# Patient Record
Sex: FEMALE | Race: WHITE | NOT HISPANIC OR LATINO | URBAN - METROPOLITAN AREA
[De-identification: names, ages, dates, MRNs, and addresses within clinical notes are randomized per-mention and may not be internally consistent; named-entity substitution may affect disease eponyms.]

---

## 2019-07-19 NOTE — TELEPHONE ENCOUNTER
RECORDS RECEIVED FROM: Left hand finger sprain per pt. Self referred. No outside records.   DATE RECEIVED: 07/23/19   NOTES STATUS DETAILS   OFFICE NOTE from referring provider n/a Self-referred   OFFICE NOTE from other specialist n/a    DISCHARGE SUMMARY from hospital n/a    DISCHARGE REPORT from the ER n/a    OPERATIVE REPORT n/a    MEDICATION LIST n/a    IMPLANT RECORD/STICKER n/a    LABS     CBC/DIFF n/a    CULTURES n/a    INJECTIONS DONE IN RADIOLOGY n/a    MRI n/a    CT SCAN n/a    XRAYS (IMAGES & REPORTS) n/a    TUMOR     PATHOLOGY  Slides & report n/a

## 2019-07-23 ENCOUNTER — OFFICE VISIT (OUTPATIENT)
Dept: ORTHOPEDICS | Facility: CLINIC | Age: 69
End: 2019-07-23
Payer: COMMERCIAL

## 2019-07-23 ENCOUNTER — PRE VISIT (OUTPATIENT)
Dept: ORTHOPEDICS | Facility: CLINIC | Age: 69
End: 2019-07-23

## 2019-07-23 ENCOUNTER — ANCILLARY PROCEDURE (OUTPATIENT)
Dept: GENERAL RADIOLOGY | Facility: CLINIC | Age: 69
End: 2019-07-23
Attending: FAMILY MEDICINE
Payer: COMMERCIAL

## 2019-07-23 VITALS — DIASTOLIC BLOOD PRESSURE: 84 MMHG | HEART RATE: 16 BPM | SYSTOLIC BLOOD PRESSURE: 139 MMHG | RESPIRATION RATE: 16 BRPM

## 2019-07-23 DIAGNOSIS — M79.645 FINGER PAIN, LEFT: ICD-10-CM

## 2019-07-23 DIAGNOSIS — S62.647A CLOSED NONDISPLACED FRACTURE OF PROXIMAL PHALANX OF LEFT LITTLE FINGER, INITIAL ENCOUNTER: ICD-10-CM

## 2019-07-23 DIAGNOSIS — M79.645 FINGER PAIN, LEFT: Primary | ICD-10-CM

## 2019-07-23 RX ORDER — CHOLECALCIFEROL (VITAMIN D3) 50 MCG
2000 TABLET ORAL
COMMUNITY
Start: 2015-03-23

## 2019-07-23 NOTE — PROGRESS NOTES
"Sports Medicine Clinic Visit    PCP: Ana Matute Peter is a 69 year old female who is seen  as self referral presenting with left little finger pain. She states that she was playing basketball and \"jammed her finger.\"    Injury: ball to finger    Location of Pain: left finger  Duration of Pain: 6 week(s)  Rating of Pain: 5/10  Pain is better with: Ice  Pain is worse with: making a fist   Additional Features: She is RHD   Treatment so far consists of: Ice  Prior History of related problems: None     /84   Pulse (!) 16   Resp 16          PMH:  No past medical history on file.    Active problem list:  There is no problem list on file for this patient.      FH:  No family history on file.    SH:  Social History     Socioeconomic History     Marital status: Single     Spouse name: Not on file     Number of children: Not on file     Years of education: Not on file     Highest education level: Not on file   Occupational History     Not on file   Social Needs     Financial resource strain: Not on file     Food insecurity:     Worry: Not on file     Inability: Not on file     Transportation needs:     Medical: Not on file     Non-medical: Not on file   Tobacco Use     Smoking status: Never Smoker     Smokeless tobacco: Never Used   Substance and Sexual Activity     Alcohol use: Not on file     Drug use: Not on file     Sexual activity: Not on file   Lifestyle     Physical activity:     Days per week: Not on file     Minutes per session: Not on file     Stress: Not on file   Relationships     Social connections:     Talks on phone: Not on file     Gets together: Not on file     Attends Jehovah's witness service: Not on file     Active member of club or organization: Not on file     Attends meetings of clubs or organizations: Not on file     Relationship status: Not on file     Intimate partner violence:     Fear of current or ex partner: Not on file     Emotionally abused: Not on file     Physically abused: " Not on file     Forced sexual activity: Not on file   Other Topics Concern     Not on file   Social History Narrative     Not on file       MEDS:  See EMR, reviewed  ALL:  See EMR, reviewed    REVIEW OF SYSTEMS:  CONSTITUTIONAL:NEGATIVE for fever, chills, change in weight  INTEGUMENTARY/SKIN: NEGATIVE for worrisome rashes, moles or lesions  EYES: NEGATIVE for vision changes or irritation  ENT/MOUTH: NEGATIVE for ear, mouth and throat problems  RESP:NEGATIVE for significant cough or SOB  BREAST: NEGATIVE for masses, tenderness or discharge  CV: NEGATIVE for chest pain, palpitations or peripheral edema  GI: NEGATIVE for nausea, abdominal pain, heartburn, or change in bowel habits  :NEGATIVE for frequency, dysuria, or hematuria  :NEGATIVE for frequency, dysuria, or hematuria  NEURO: NEGATIVE for weakness, dizziness or paresthesias  ENDOCRINE: NEGATIVE for temperature intolerance, skin/hair changes  HEME/ALLERGY/IMMUNE: NEGATIVE for bleeding problems  PSYCHIATRIC: NEGATIVE for changes in mood or affect      Subjective: She points to the PIP joint of the fifth finger as the digit that was initially swollen.  She has a mild amount of residual swelling.  She has a mild amount of swelling along the ulnar aspect of the distal proximal phalanx of the fifth digit.  There is no mallet finger and there is no swan-neck deformity.  She can make a full fist with normal tracking of the digit.  She has intact strength independently to flexion at the DIP, PIP, MCP and instead intact strength individually to extension at the DIP, PIP, MCP.  Stressing of the collateral ligaments of the PIP joint shows no laxity.  She is nontender over the volar plate.  Sensation is intact.  Capillary refill is normal.    Appropriate in conversation and affect.    An x-ray of the finger shows a tiny gavino avulsion at the ulnar aspect of the fifth PIP joint near the origin of the ulnar collateral ligament.  She has some mild scattered DJD changes  throughout the hand.    Assessment finger sprain with tiny avulsion fracture, healing    Plan: At this point do not think she needs to protect it with deshawn taping.  She knows that the swelling sometimes will take many months to disappear completely.  She has been using it for daily activities without restriction and I think she can continue to do so.  She is not involved in basketball on a regular basis and plans on avoiding it for now.  She will follow-up if not slowly improving over the next 6 weeks.

## 2019-07-23 NOTE — LETTER
"  7/23/2019      RE: Shereen Green  [address]  [address]  [place] [address]       Sports Medicine Clinic Visit    PCP: Ana Matute    Shereen Green is a 69 year old female who is seen  as self referral presenting with left little finger pain. She states that she was playing basketball and \"jammed her finger.\"    Injury: ball to finger    Location of Pain: left finger  Duration of Pain: 6 week(s)  Rating of Pain: 5/10  Pain is better with: Ice  Pain is worse with: making a fist   Additional Features: She is RHD   Treatment so far consists of: Ice  Prior History of related problems: None     /84   Pulse (!) 16   Resp 16          PMH:  No past medical history on file.    Active problem list:  There is no problem list on file for this patient.      FH:  No family history on file.    SH:  Social History     Socioeconomic History     Marital status: Single     Spouse name: Not on file     Number of children: Not on file     Years of education: Not on file     Highest education level: Not on file   Occupational History     Not on file   Social Needs     Financial resource strain: Not on file     Food insecurity:     Worry: Not on file     Inability: Not on file     Transportation needs:     Medical: Not on file     Non-medical: Not on file   Tobacco Use     Smoking status: Never Smoker     Smokeless tobacco: Never Used   Substance and Sexual Activity     Alcohol use: Not on file     Drug use: Not on file     Sexual activity: Not on file   Lifestyle     Physical activity:     Days per week: Not on file     Minutes per session: Not on file     Stress: Not on file   Relationships     Social connections:     Talks on phone: Not on file     Gets together: Not on file     Attends Mu-ism service: Not on file     Active member of club or organization: Not on file     Attends meetings of clubs or organizations: Not on file     Relationship status: Not on file     Intimate partner violence:     Fear " of current or ex partner: Not on file     Emotionally abused: Not on file     Physically abused: Not on file     Forced sexual activity: Not on file   Other Topics Concern     Not on file   Social History Narrative     Not on file       MEDS:  See EMR, reviewed  ALL:  See EMR, reviewed    REVIEW OF SYSTEMS:  CONSTITUTIONAL:NEGATIVE for fever, chills, change in weight  INTEGUMENTARY/SKIN: NEGATIVE for worrisome rashes, moles or lesions  EYES: NEGATIVE for vision changes or irritation  ENT/MOUTH: NEGATIVE for ear, mouth and throat problems  RESP:NEGATIVE for significant cough or SOB  BREAST: NEGATIVE for masses, tenderness or discharge  CV: NEGATIVE for chest pain, palpitations or peripheral edema  GI: NEGATIVE for nausea, abdominal pain, heartburn, or change in bowel habits  :NEGATIVE for frequency, dysuria, or hematuria  :NEGATIVE for frequency, dysuria, or hematuria  NEURO: NEGATIVE for weakness, dizziness or paresthesias  ENDOCRINE: NEGATIVE for temperature intolerance, skin/hair changes  HEME/ALLERGY/IMMUNE: NEGATIVE for bleeding problems  PSYCHIATRIC: NEGATIVE for changes in mood or affect      Subjective: She points to the PIP joint of the fifth finger as the digit that was initially swollen.  She has a mild amount of residual swelling.  She has a mild amount of swelling along the ulnar aspect of the distal proximal phalanx of the fifth digit.  There is no mallet finger and there is no swan-neck deformity.  She can make a full fist with normal tracking of the digit.  She has intact strength independently to flexion at the DIP, PIP, MCP and instead intact strength individually to extension at the DIP, PIP, MCP.  Stressing of the collateral ligaments of the PIP joint shows no laxity.  She is nontender over the volar plate.  Sensation is intact.  Capillary refill is normal.    Appropriate in conversation and affect.    An x-ray of the finger shows a tiny gavino avulsion at the ulnar aspect of the fifth PIP  joint near the origin of the ulnar collateral ligament.  She has some mild scattered DJD changes throughout the hand.    Assessment finger sprain with tiny avulsion fracture, healing    Plan: At this point do not think she needs to protect it with deshawn taping.  She knows that the swelling sometimes will take many months to disappear completely.  She has been using it for daily activities without restriction and I think she can continue to do so.  She is not involved in basketball on a regular basis and plans on avoiding it for now.  She will follow-up if not slowly improving over the next 6 weeks.        Alxeis Ritter MD

## 2021-03-30 ENCOUNTER — OFFICE VISIT (OUTPATIENT)
Dept: OPHTHALMOLOGY | Facility: CLINIC | Age: 71
End: 2021-03-30
Payer: COMMERCIAL

## 2021-03-30 DIAGNOSIS — H43.813 POSTERIOR VITREOUS DETACHMENT OF BOTH EYES: ICD-10-CM

## 2021-03-30 DIAGNOSIS — H25.813 COMBINED FORMS OF AGE-RELATED CATARACT OF BOTH EYES: Primary | ICD-10-CM

## 2021-03-30 DIAGNOSIS — H52.4 PRESBYOPIA: ICD-10-CM

## 2021-03-30 DIAGNOSIS — Z98.890 HISTORY OF REPAIR OF RETINAL TEAR BY LASER PHOTOCOAGULATION: ICD-10-CM

## 2021-03-30 PROBLEM — Z13.820 OSTEOPOROSIS SCREENING: Status: ACTIVE | Noted: 2017-06-26

## 2021-03-30 PROCEDURE — 92004 COMPRE OPH EXAM NEW PT 1/>: CPT | Performed by: OPHTHALMOLOGY

## 2021-03-30 PROCEDURE — 92015 DETERMINE REFRACTIVE STATE: CPT | Performed by: OPHTHALMOLOGY

## 2021-03-30 RX ORDER — MULTIVITAMIN
1 TABLET ORAL
COMMUNITY
Start: 2020-12-23

## 2021-03-30 ASSESSMENT — REFRACTION_MANIFEST
OD_ADD: +2.75
OD_CYLINDER: SPHERE
OD_SPHERE: -1.00
OS_ADD: +2.75
OS_AXIS: 179
OS_CYLINDER: +1.00
OS_SPHERE: -2.00

## 2021-03-30 ASSESSMENT — REFRACTION_WEARINGRX
OS_SPHERE: -1.75
OS_AXIS: 180
OD_CYLINDER: +0.25
SPECS_TYPE: SVL
OD_SPHERE: -0.75
OD_AXIS: 170
OS_CYLINDER: +0.75

## 2021-03-30 ASSESSMENT — VISUAL ACUITY
OS_CC+: -1
OD_CC: 20/25
OD_CC+: -1
OS_CC: 20/25
METHOD: SNELLEN - LINEAR
CORRECTION_TYPE: GLASSES

## 2021-03-30 ASSESSMENT — TONOMETRY
IOP_METHOD: APPLANATION
OD_IOP_MMHG: 14
OS_IOP_MMHG: 14

## 2021-03-30 ASSESSMENT — CONF VISUAL FIELD
OD_NORMAL: 1
OS_NORMAL: 1

## 2021-03-30 NOTE — PATIENT INSTRUCTIONS
Will observe cataracts for now.  Glasses Rx given - optional   Could try OTC half readers - +1.25 or +1.50  Use artificial tears up to 4 times daily both eyes as needed (Refresh Tears, Systane Ultra/Balance, or Theratears)   Call in November 2021 for an appointment in March 2022 for Complete Exam    Dr. Eldridge [phone]

## 2021-03-30 NOTE — PROGRESS NOTES
Current Eye Medications:  None     Subjective:  Here for cataract evaluation. Saw the last doctor in August, was told she has cataracts. Feels the eyes are more tired at the end of the day. Also feels there are some spots missing in the vision. In the past three to four weeks the eyes are so much more dry, wants to know what she might be safe to use. Had retinal tears repaired in the left eye at one time.    Previously saw Michael Chavez.  Freddie did laser left eye after fall down steps.  Glasses just for driving and TV; states reads well without correction.     Objective:  See Ophthalmology Exam.       Assessment:  Baseline eye exam in patient with mild-moderate cataracts and hx of laser for retinal tear left eye.      ICD-10-CM    1. Combined forms of age-related cataract, mild-mod, of both eyes  H25.813    2. History of repair of retinal tear by laser photocoagulation, os  Z98.890    3. Posterior vitreous detachment of both eyes  H43.813    4. Presbyopia  H52.4         Plan:  Will observe cataracts for now.  Glasses Rx given - optional   Could try OTC half readers - +1.25 or +1.50  Use artificial tears up to 4 times daily both eyes as needed (Refresh Tears, Systane Ultra/Balance, or Theratears)   Call in November 2021 for an appointment in March 2022 for Complete Exam    Dr. Eldridge [phone]

## 2021-03-30 NOTE — LETTER
3/30/2021         RE: Shereen Green  [address] [place] [address]        Dear Colleague,    Thank you for referring your patient, Shereen Green, to the [facility]. Please see a copy of my visit note below.     Current Eye Medications:  None     Subjective:  Here for cataract evaluation. Saw the last doctor in August, was told she has cataracts. Feels the eyes are more tired at the end of the day. Also feels there are some spots missing in the vision. In the past three to four weeks the eyes are so much more dry, wants to know what she might be safe to use. Had retinal tears repaired in the left eye at one time.    Previously saw Michael Chavez.  Freddie did laser left eye after fall down steps.  Glasses just for driving and TV; states reads well without correction.     Objective:  See Ophthalmology Exam.       Assessment:  Baseline eye exam in patient with mild-moderate cataracts and hx of laser for retinal tear left eye.      ICD-10-CM    1. Combined forms of age-related cataract, mild-mod, of both eyes  H25.813    2. History of repair of retinal tear by laser photocoagulation, os  Z98.890    3. Posterior vitreous detachment of both eyes  H43.813    4. Presbyopia  H52.4         Plan:  Will observe cataracts for now.  Glasses Rx given - optional   Could try OTC half readers - +1.25 or +1.50  Use artificial tears up to 4 times daily both eyes as needed (Refresh Tears, Systane Ultra/Balance, or Theratears)   Call in November 2021 for an appointment in March 2022 for Complete Exam    Dr. Eldridge [phone]        Again, thank you for allowing me to participate in the care of your patient.        Sincerely,        Demetrio Eldridge MD

## 2021-04-03 PROBLEM — Z98.890 HISTORY OF REPAIR OF RETINAL TEAR BY LASER PHOTOCOAGULATION: Status: ACTIVE | Noted: 2021-04-03

## 2021-04-03 PROBLEM — H25.813 COMBINED FORMS OF AGE-RELATED CATARACT OF BOTH EYES: Status: ACTIVE | Noted: 2021-04-03

## 2021-04-03 PROBLEM — H43.813 POSTERIOR VITREOUS DETACHMENT OF BOTH EYES: Status: ACTIVE | Noted: 2021-04-03

## 2021-04-03 ASSESSMENT — SLIT LAMP EXAM - LIDS
COMMENTS: NORMAL
COMMENTS: NORMAL

## 2021-04-03 ASSESSMENT — EXTERNAL EXAM - RIGHT EYE: OD_EXAM: MILD TO MOD BROW

## 2021-04-03 ASSESSMENT — CUP TO DISC RATIO
OD_RATIO: 0.1
OS_RATIO: 0.3

## 2021-11-11 NOTE — TELEPHONE ENCOUNTER
DIAGNOSIS: right knee pain,self,no imaging   APPOINTMENT DATE: 11.16.21   NOTES STATUS DETAILS   OFFICE NOTE from referring provider N/A    OFFICE NOTE from other specialist N/A    DISCHARGE SUMMARY from hospital N/A    DISCHARGE REPORT from the ER N/A    OPERATIVE REPORT N/A    EMG report N/A    MEDICATION LIST Internal    MRI N/A    DEXA (osteoporosis/bone health) N/A    CT SCAN N/A    XRAYS (IMAGES & REPORTS) N/A

## 2021-11-16 ENCOUNTER — PRE VISIT (OUTPATIENT)
Dept: ORTHOPEDICS | Facility: CLINIC | Age: 71
End: 2021-11-16

## 2021-11-19 ENCOUNTER — TELEPHONE (OUTPATIENT)
Dept: OPHTHALMOLOGY | Facility: CLINIC | Age: 71
End: 2021-11-19
Payer: COMMERCIAL

## 2021-11-19 NOTE — TELEPHONE ENCOUNTER
Reason for Call:  Other call back    Detailed comments: Patient is calling to schedule surgery orders are needed. Thank yo u    Phone Number Patient can be reached at: Home number on file [phone] (home)    Best Time: any    Can we leave a detailed message on this number? YES    Call taken on 11/19/2021 at 12:28 PM by Valerie Valles

## 2021-11-23 DIAGNOSIS — M25.569 KNEE PAIN: Primary | ICD-10-CM

## 2021-11-24 ENCOUNTER — OFFICE VISIT (OUTPATIENT)
Dept: ORTHOPEDICS | Facility: CLINIC | Age: 71
End: 2021-11-24
Payer: COMMERCIAL

## 2021-11-24 ENCOUNTER — ANCILLARY PROCEDURE (OUTPATIENT)
Dept: GENERAL RADIOLOGY | Facility: CLINIC | Age: 71
End: 2021-11-24
Attending: FAMILY MEDICINE
Payer: COMMERCIAL

## 2021-11-24 VITALS — BODY MASS INDEX: 23.7 KG/M2 | WEIGHT: 175 LBS | HEIGHT: 72 IN

## 2021-11-24 DIAGNOSIS — M25.561 RIGHT KNEE PAIN: ICD-10-CM

## 2021-11-24 DIAGNOSIS — M23.306 DEGENERATIVE TEAR OF MENISCUS, RIGHT: ICD-10-CM

## 2021-11-24 DIAGNOSIS — M71.21 BAKER CYST, RIGHT: ICD-10-CM

## 2021-11-24 DIAGNOSIS — M17.11 PRIMARY OSTEOARTHRITIS OF RIGHT KNEE: Primary | ICD-10-CM

## 2021-11-24 LAB — RADIOLOGIST FLAGS: NORMAL

## 2021-11-24 PROCEDURE — 73560 X-RAY EXAM OF KNEE 1 OR 2: CPT | Mod: LT | Performed by: RADIOLOGY

## 2021-11-24 PROCEDURE — 99213 OFFICE O/P EST LOW 20 MIN: CPT | Performed by: FAMILY MEDICINE

## 2021-11-24 ASSESSMENT — MIFFLIN-ST. JEOR: SCORE: 1406.82

## 2021-11-24 NOTE — LETTER
11/24/2021      RE: Shereen Green  [address] [place] [address]       Sports Medicine Clinic Visit    PCP: Ana Matute    Shereen Green is a 71 year old female who is seen  as self referral presenting with right knee pain.  3 weeks ago she developed discomfort particular to the posterior aspect of her right knee with visible swelling and discomfort in the upper calf.  It was difficult to bend and straighten the knee.  It was tender to touch in the area of the posterior knee and calf.  It was hard to bear weight on the knee.  She can think of no injury.  Since that time the knee has improved significantly and she can now bear weight, can bend and straighten the knee and the swelling and discomfort in the posterior knee has resolved.  She has a past history of a Baker's cyst involving the opposite knee.  X-rays of the right knee in 2013 showed mild DJD.    Injury: No specific injury    Location of Pain: right posterior knee pain  Duration of Pain: 3 week(s)  Rating of Pain: 10/10  Pain is better with: Ice, Ibuprofen and Elevation  Pain is worse with: general movement  Additional Features: swelling  Treatment so far consists of: Ice, Ibuprofen and elevation  Prior History of related problems: no.  Mild rt knee djd on xray 2013.        There were no vitals taken for this visit.        EXAM: XR KNEE 3 VIEWS RIGHT   LOCATION: [facility]   DATE/TIME: 11/11/2021 11:30 AM     INDICATION: Acute Pain Of Right Knee   COMPARISON: None.     IMPRESSION: Mild scattered degenerative changes right knee. Right knee otherwise negative. No fracture or dislocation. No joint effusion.          PMH:  Past Medical History:   Diagnosis Date     Arthritis      Nonsenile cataract      Retinal detachment    htn  Basal cell ca  Prolapsed mitral valve  Back and neck injuries  tonsillectomy 1950  Hysterectomy 2000  Wrist tendon release 2005    Active problem list:  Patient Active Problem List    Diagnosis     Cervical spine arthritis     Chronic headaches     Lumbago     Mixed hyperlipidemia     Osteoporosis screening     TR (tricuspid regurgitation)     History of repair of retinal tear by laser photocoagulation, os     Posterior vitreous detachment of both eyes     Combined forms of age-related cataract, mild-mod, of both eyes       FH:  No family history on file.    SH:  Social History     Socioeconomic History     Marital status: Single     Spouse name: Not on file     Number of children: Not on file     Years of education: Not on file     Highest education level: Not on file   Occupational History     Not on file   Tobacco Use     Smoking status: Never Smoker     Smokeless tobacco: Never Used   Substance and Sexual Activity     Alcohol use: Not on file     Drug use: Not on file     Sexual activity: Not on file   Other Topics Concern     Not on file   Social History Narrative     Not on file     Social Determinants of Health     Financial Resource Strain: Not on file   Food Insecurity: Not on file   Transportation Needs: Not on file   Physical Activity: Not on file   Stress: Not on file   Social Connections: Not on file   Intimate Partner Violence: Not on file   Housing Stability: Not on file       MEDS:  See EMR, reviewed  ALL:  See EMR, reviewed    REVIEW OF SYSTEMS:  CONSTITUTIONAL:NEGATIVE for fever, chills, change in weight  INTEGUMENTARY/SKIN: NEGATIVE for worrisome rashes, moles or lesions  EYES: NEGATIVE for vision changes or irritation  ENT/MOUTH: NEGATIVE for ear, mouth and throat problems  RESP:NEGATIVE for significant cough or SOB  BREAST: NEGATIVE for masses, tenderness or discharge  CV: NEGATIVE for chest pain, palpitations or peripheral edema  GI: NEGATIVE for nausea, abdominal pain, heartburn, or change in bowel habits  :NEGATIVE for frequency, dysuria, or hematuria  :NEGATIVE for frequency, dysuria, or hematuria  NEURO: NEGATIVE for weakness, dizziness or paresthesias  ENDOCRINE:  NEGATIVE for temperature intolerance, skin/hair changes  HEME/ALLERGY/IMMUNE: NEGATIVE for bleeding problems  PSYCHIATRIC: NEGATIVE for changes in mood or affect        MR Spine Lumbar wo Contrast * 2/16/2006    Anatomical Region Laterality Modality   Spine -- Magnetic Resonance   LUMBAR SPINE -- --       Impression    1.  Spondylotic and discogenic degenerative change at L4-5 resulting in   moderate narrowing of the lateral recesses bilaterally with mild   compression of the traversing L5 nerve roots bilaterally.           Ct scan ABD/pelvis 12/23/2020 showed lumbar spine djd changes, without spondylolisthesis noted.          Objective: Today in evaluation of the posterior knees visually I do not see obvious swelling or discoloration.  The right knee is nontender today in the upper calf or the popliteal space but there is a sensation of some mild residual swelling that could be associated with a Baker's cyst on the right popliteal space compared to the nonaffected left knee.  She allows a full flexion of the knee in full extension which she says was very difficult to do more than a week ago.  She is nontender over the medial joint line or the lateral joint line.  There is no effusion involving the knee itself.  Patellar apprehension signs are negative.  Nontender over the patellar tendon or pes anserine bursa.  Overlying skin is normal.  Appropriate in conversation affect.    She indicates she had nonweightbearing x-rays of her knee within the past 3 weeks.  Request was made to obtain these x-rays.  In the meantime we ordered weightbearing PA and AP views standing of the knee that suggests increased medial joint space narrowing compared to 2013 with her bilateral knees with severe DJD noted on the left and moderate DJD on the right.  No signs of osteonecrosis.  She denies left-sided knee discomfort    Assessment right-sided knee degenerative meniscal tear and Baker's cyst with moderate DJD    Plan: She declines the  need for a cortisone injection or additional pain medicines.  We talked about the natural history and conservative care of Baker's cyst and degenerative disease of the knee.  We discussed indications for cortisone injections for pain control or indications for knee replacement.  She had no further questions and will follow-up as needed.        Alexis Ritter MD

## 2021-11-24 NOTE — PROGRESS NOTES
Sports Medicine Clinic Visit    PCP: Ana Matute VERITO Green is a 71 year old female who is seen  as self referral presenting with right knee pain.  3 weeks ago she developed discomfort particular to the posterior aspect of her right knee with visible swelling and discomfort in the upper calf.  It was difficult to bend and straighten the knee.  It was tender to touch in the area of the posterior knee and calf.  It was hard to bear weight on the knee.  She can think of no injury.  Since that time the knee has improved significantly and she can now bear weight, can bend and straighten the knee and the swelling and discomfort in the posterior knee has resolved.  She has a past history of a Baker's cyst involving the opposite knee.  X-rays of the right knee in 2013 showed mild DJD.    Injury: No specific injury    Location of Pain: right posterior knee pain  Duration of Pain: 3 week(s)  Rating of Pain: 10/10  Pain is better with: Ice, Ibuprofen and Elevation  Pain is worse with: general movement  Additional Features: swelling  Treatment so far consists of: Ice, Ibuprofen and elevation  Prior History of related problems: no.  Mild rt knee djd on xray 2013.        There were no vitals taken for this visit.        EXAM: XR KNEE 3 VIEWS RIGHT   LOCATION: [facility]   DATE/TIME: 11/11/2021 11:30 AM     INDICATION: Acute Pain Of Right Knee   COMPARISON: None.     IMPRESSION: Mild scattered degenerative changes right knee. Right knee otherwise negative. No fracture or dislocation. No joint effusion.          PMH:  Past Medical History:   Diagnosis Date     Arthritis      Nonsenile cataract      Retinal detachment    htn  Basal cell ca  Prolapsed mitral valve  Back and neck injuries  tonsillectomy 1950  Hysterectomy 2000  Wrist tendon release 2005    Active problem list:  Patient Active Problem List   Diagnosis     Cervical spine arthritis     Chronic headaches     Lumbago     Mixed  hyperlipidemia     Osteoporosis screening     TR (tricuspid regurgitation)     History of repair of retinal tear by laser photocoagulation, os     Posterior vitreous detachment of both eyes     Combined forms of age-related cataract, mild-mod, of both eyes       FH:  No family history on file.    SH:  Social History     Socioeconomic History     Marital status: Single     Spouse name: Not on file     Number of children: Not on file     Years of education: Not on file     Highest education level: Not on file   Occupational History     Not on file   Tobacco Use     Smoking status: Never Smoker     Smokeless tobacco: Never Used   Substance and Sexual Activity     Alcohol use: Not on file     Drug use: Not on file     Sexual activity: Not on file   Other Topics Concern     Not on file   Social History Narrative     Not on file     Social Determinants of Health     Financial Resource Strain: Not on file   Food Insecurity: Not on file   Transportation Needs: Not on file   Physical Activity: Not on file   Stress: Not on file   Social Connections: Not on file   Intimate Partner Violence: Not on file   Housing Stability: Not on file       MEDS:  See EMR, reviewed  ALL:  See EMR, reviewed    REVIEW OF SYSTEMS:  CONSTITUTIONAL:NEGATIVE for fever, chills, change in weight  INTEGUMENTARY/SKIN: NEGATIVE for worrisome rashes, moles or lesions  EYES: NEGATIVE for vision changes or irritation  ENT/MOUTH: NEGATIVE for ear, mouth and throat problems  RESP:NEGATIVE for significant cough or SOB  BREAST: NEGATIVE for masses, tenderness or discharge  CV: NEGATIVE for chest pain, palpitations or peripheral edema  GI: NEGATIVE for nausea, abdominal pain, heartburn, or change in bowel habits  :NEGATIVE for frequency, dysuria, or hematuria  :NEGATIVE for frequency, dysuria, or hematuria  NEURO: NEGATIVE for weakness, dizziness or paresthesias  ENDOCRINE: NEGATIVE for temperature intolerance, skin/hair changes  HEME/ALLERGY/IMMUNE:  NEGATIVE for bleeding problems  PSYCHIATRIC: NEGATIVE for changes in mood or affect        MR Spine Lumbar wo Contrast * 2/16/2006    Anatomical Region Laterality Modality   Spine -- Magnetic Resonance   LUMBAR SPINE -- --       Impression    1.  Spondylotic and discogenic degenerative change at L4-5 resulting in   moderate narrowing of the lateral recesses bilaterally with mild   compression of the traversing L5 nerve roots bilaterally.           Ct scan ABD/pelvis 12/23/2020 showed lumbar spine djd changes, without spondylolisthesis noted.          Objective: Today in evaluation of the posterior knees visually I do not see obvious swelling or discoloration.  The right knee is nontender today in the upper calf or the popliteal space but there is a sensation of some mild residual swelling that could be associated with a Baker's cyst on the right popliteal space compared to the nonaffected left knee.  She allows a full flexion of the knee in full extension which she says was very difficult to do more than a week ago.  She is nontender over the medial joint line or the lateral joint line.  There is no effusion involving the knee itself.  Patellar apprehension signs are negative.  Nontender over the patellar tendon or pes anserine bursa.  Overlying skin is normal.  Appropriate in conversation affect.    She indicates she had nonweightbearing x-rays of her knee within the past 3 weeks.  Request was made to obtain these x-rays.  In the meantime we ordered weightbearing PA and AP views standing of the knee that suggests increased medial joint space narrowing compared to 2013 with her bilateral knees with severe DJD noted on the left and moderate DJD on the right.  No signs of osteonecrosis.  She denies left-sided knee discomfort    Assessment right-sided knee degenerative meniscal tear and Baker's cyst with moderate DJD    Plan: She declines the need for a cortisone injection or additional pain medicines.  We talked about  the natural history and conservative care of Baker's cyst and degenerative disease of the knee.  We discussed indications for cortisone injections for pain control or indications for knee replacement.  She had no further questions and will follow-up as needed.

## 2021-12-01 NOTE — PROGRESS NOTES
"  Patient presents for follow-up for her right knee.  She is having enough weightbearing pain that she would like a right-sided cortisone injection per last discussion.  She tolerated a left-sided procedure involving cortisone for knee without side effect.  She does tell me that she had a very uncomfortable cortisone injection in the heel that caused her to need assisted weightbearing for a week after the heel procedure.  She did not have an allergic reaction with an anaphylactic response.    ight knee pain/djd/baker's cyst fup.  Recent standing of the knee that suggests increased medial joint space narrowing compared to 2013 with her bilateral knees with severe DJD noted on the left and moderate DJD on the right.    4 weeks ago she developed discomfort particular to the posterior aspect of her right knee with visible swelling and discomfort in the upper calf.  It was difficult to bend and straighten the knee.  It was tender to touch in the area of the posterior knee and calf.  It was hard to bear weight on the knee.  She can think of no injury.  Since that time the knee has improved and she can now bear weight, can bend and straighten the knee and the swelling and discomfort in the posterior knee has resolved.  She has a past history of a Baker's cyst involving the opposite knee.  X-rays of the right knee in 2013 showed mild DJD.    Left knee xray 11/24/21 has incidental finding of possible enchondroma, with MRI suggested by radiology.  She has no left knee pain.  \"Impression:  1. Left distal femoral density which is decreased in size from prior,  however, with new increased adjacent radiolucency Recommend MRI to  follow up.  2. Medial tibiofemoral predominant osteoarthrosis bilaterally.\"      PMH:  Past Medical History:   Diagnosis Date     Arthritis      Nonsenile cataract      Retinal detachment      htn  Basal cell ca  Prolapsed mitral valve  Back and neck injuries  tonsillectomy 1950  Hysterectomy 2000  Wrist " tendon release 2005  Active problem list:  Patient Active Problem List   Diagnosis     Cervical spine arthritis     Chronic headaches     Lumbago     Mixed hyperlipidemia     Osteoporosis screening     TR (tricuspid regurgitation)     History of repair of retinal tear by laser photocoagulation, os     Posterior vitreous detachment of both eyes     Combined forms of age-related cataract, mild-mod, of both eyes       FH:  No family history on file.    SH:  Social History     Socioeconomic History     Marital status: Single     Spouse name: Not on file     Number of children: Not on file     Years of education: Not on file     Highest education level: Not on file   Occupational History     Not on file   Tobacco Use     Smoking status: Never Smoker     Smokeless tobacco: Never Used   Substance and Sexual Activity     Alcohol use: Not on file     Drug use: Not on file     Sexual activity: Not on file   Other Topics Concern     Not on file   Social History Narrative     Not on file     Social Determinants of Health     Financial Resource Strain: Not on file   Food Insecurity: Not on file   Transportation Needs: Not on file   Physical Activity: Not on file   Stress: Not on file   Social Connections: Not on file   Intimate Partner Violence: Not on file   Housing Stability: Not on file       MEDS:  See EMR, reviewed  ALL:  See EMR, reviewed    REVIEW OF SYSTEMS:  CONSTITUTIONAL:NEGATIVE for fever, chills, change in weight  INTEGUMENTARY/SKIN: NEGATIVE for worrisome rashes, moles or lesions  EYES: NEGATIVE for vision changes or irritation  ENT/MOUTH: NEGATIVE for ear, mouth and throat problems  RESP:NEGATIVE for significant cough or SOB  BREAST: NEGATIVE for masses, tenderness or discharge  CV: NEGATIVE for chest pain, palpitations or peripheral edema  GI: NEGATIVE for nausea, abdominal pain, heartburn, or change in bowel habits  :NEGATIVE for frequency, dysuria, or hematuria  :NEGATIVE for frequency, dysuria, or  hematuria  NEURO: NEGATIVE for weakness, dizziness or paresthesias  ENDOCRINE: NEGATIVE for temperature intolerance, skin/hair changes  HEME/ALLERGY/IMMUNE: NEGATIVE for bleeding problems  PSYCHIATRIC: NEGATIVE for changes in mood or affect    Large Joint Injection/Arthocentesis: R knee joint    Date/Time: 12/2/2021 10:34 AM  Performed by: Alexis Ritter MD  Authorized by: Alexis Ritter MD     Indications:  Pain and osteoarthritis  Needle Size:  25 G  Guidance: landmark guided    Approach:  Anterolateral  Location:  Knee      Medications:  40 mg triamcinolone 40 MG/ML; 4 mL lidocaine (PF) 1 %  Outcome:  Tolerated well, no immediate complications  Procedure discussed: discussed risks, benefits, and alternatives    Consent Given by:  Patient  Timeout: timeout called immediately prior to procedure    Prep: patient was prepped and draped in usual sterile fashion     Scribed by Valentín Johnson ATC, ATC for Dr. Ritter on 12/2/21 at 10:00AM, based on the provider's statements to me.                Objective I do not palpate an intra-articular effusion involving the right knee.  There is no redness.  There is no tenderness in the popliteal space or calf.  She is tender over the medial joint line but nontender over the lateral joint line.  I can flex and extend the right knee fully.  Overlying skin is normal.  Appropriate in conversation affect.    Assessment right-sided knee DJD.  Left-sided distal femur bone lesion on x-ray    Plan: We went over her left femur pictures.  The radiologist suggested an MRI.  An MRI is pending as well as a phone conversation after the MRI results return.  For the right knee, after informed consent about bleeding, infection, steroid flare after prepping with surgical scrub she was injected in the right knee from a lateral approach in a seated position with 1 cc of Kenalog 40 and 5 cc of 1% lidocaine.  The medicine went in easily she left the clinic ambulatory she will look for  improved with the injection and follow-up as needed.  She would like to see physical therapy for her right knee for some alignment exercises in the setting of arthritis.  Referral placed.

## 2021-12-02 ENCOUNTER — OFFICE VISIT (OUTPATIENT)
Dept: ORTHOPEDICS | Facility: CLINIC | Age: 71
End: 2021-12-02
Payer: COMMERCIAL

## 2021-12-02 VITALS — WEIGHT: 175 LBS | BODY MASS INDEX: 23.7 KG/M2 | HEIGHT: 72 IN

## 2021-12-02 DIAGNOSIS — M89.9 LYTIC BONE LESION OF LEFT FEMUR: Primary | ICD-10-CM

## 2021-12-02 DIAGNOSIS — M17.11 PRIMARY OSTEOARTHRITIS OF RIGHT KNEE: ICD-10-CM

## 2021-12-02 PROCEDURE — 20610 DRAIN/INJ JOINT/BURSA W/O US: CPT | Mod: RT | Performed by: FAMILY MEDICINE

## 2021-12-02 PROCEDURE — 99213 OFFICE O/P EST LOW 20 MIN: CPT | Mod: 25 | Performed by: FAMILY MEDICINE

## 2021-12-02 RX ADMIN — TRIAMCINOLONE ACETONIDE 40 MG: 40 INJECTION, SUSPENSION INTRA-ARTICULAR; INTRAMUSCULAR at 10:34

## 2021-12-02 RX ADMIN — LIDOCAINE HYDROCHLORIDE 4 ML: 10 INJECTION, SOLUTION EPIDURAL; INFILTRATION; INTRACAUDAL; PERINEURAL at 10:34

## 2021-12-02 ASSESSMENT — MIFFLIN-ST. JEOR: SCORE: 1406.82

## 2021-12-02 NOTE — LETTER
"  12/2/2021      RE: Shereen Green  [address] [facility] [address]         Patient presents for follow-up for her right knee.  She is having enough weightbearing pain that she would like a right-sided cortisone injection per last discussion.  She tolerated a left-sided procedure involving cortisone for knee without side effect.  She does tell me that she had a very uncomfortable cortisone injection in the heel that caused her to need assisted weightbearing for a week after the heel procedure.  She did not have an allergic reaction with an anaphylactic response.    ight knee pain/djd/baker's cyst fup.  Recent standing of the knee that suggests increased medial joint space narrowing compared to 2013 with her bilateral knees with severe DJD noted on the left and moderate DJD on the right.    4 weeks ago she developed discomfort particular to the posterior aspect of her right knee with visible swelling and discomfort in the upper calf.  It was difficult to bend and straighten the knee.  It was tender to touch in the area of the posterior knee and calf.  It was hard to bear weight on the knee.  She can think of no injury.  Since that time the knee has improved and she can now bear weight, can bend and straighten the knee and the swelling and discomfort in the posterior knee has resolved.  She has a past history of a Baker's cyst involving the opposite knee.  X-rays of the right knee in 2013 showed mild DJD.    Left knee xray 11/24/21 has incidental finding of possible enchondroma, with MRI suggested by radiology.  She has no left knee pain.  \"Impression:  1. Left distal femoral density which is decreased in size from prior,  however, with new increased adjacent radiolucency Recommend MRI to  follow up.  2. Medial tibiofemoral predominant osteoarthrosis bilaterally.\"      PMH:  Past Medical History:   Diagnosis Date     Arthritis      Nonsenile cataract      Retinal detachment      htn  Basal cell ca  Prolapsed " mitral valve  Back and neck injuries  tonsillectomy 1950  Hysterectomy 2000  Wrist tendon release 2005  Active problem list:  Patient Active Problem List   Diagnosis     Cervical spine arthritis     Chronic headaches     Lumbago     Mixed hyperlipidemia     Osteoporosis screening     TR (tricuspid regurgitation)     History of repair of retinal tear by laser photocoagulation, os     Posterior vitreous detachment of both eyes     Combined forms of age-related cataract, mild-mod, of both eyes       FH:  No family history on file.    SH:  Social History     Socioeconomic History     Marital status: Single     Spouse name: Not on file     Number of children: Not on file     Years of education: Not on file     Highest education level: Not on file   Occupational History     Not on file   Tobacco Use     Smoking status: Never Smoker     Smokeless tobacco: Never Used   Substance and Sexual Activity     Alcohol use: Not on file     Drug use: Not on file     Sexual activity: Not on file   Other Topics Concern     Not on file   Social History Narrative     Not on file     Social Determinants of Health     Financial Resource Strain: Not on file   Food Insecurity: Not on file   Transportation Needs: Not on file   Physical Activity: Not on file   Stress: Not on file   Social Connections: Not on file   Intimate Partner Violence: Not on file   Housing Stability: Not on file       MEDS:  See EMR, reviewed  ALL:  See EMR, reviewed    REVIEW OF SYSTEMS:  CONSTITUTIONAL:NEGATIVE for fever, chills, change in weight  INTEGUMENTARY/SKIN: NEGATIVE for worrisome rashes, moles or lesions  EYES: NEGATIVE for vision changes or irritation  ENT/MOUTH: NEGATIVE for ear, mouth and throat problems  RESP:NEGATIVE for significant cough or SOB  BREAST: NEGATIVE for masses, tenderness or discharge  CV: NEGATIVE for chest pain, palpitations or peripheral edema  GI: NEGATIVE for nausea, abdominal pain, heartburn, or change in bowel habits  :NEGATIVE  for frequency, dysuria, or hematuria  :NEGATIVE for frequency, dysuria, or hematuria  NEURO: NEGATIVE for weakness, dizziness or paresthesias  ENDOCRINE: NEGATIVE for temperature intolerance, skin/hair changes  HEME/ALLERGY/IMMUNE: NEGATIVE for bleeding problems  PSYCHIATRIC: NEGATIVE for changes in mood or affect    Large Joint Injection/Arthocentesis: R knee joint    Date/Time: 12/2/2021 10:34 AM  Performed by: Alexis Ritter MD  Authorized by: Alexis Ritter MD     Indications:  Pain and osteoarthritis  Needle Size:  25 G  Guidance: landmark guided    Approach:  Anterolateral  Location:  Knee      Medications:  40 mg triamcinolone 40 MG/ML; 4 mL lidocaine (PF) 1 %  Outcome:  Tolerated well, no immediate complications  Procedure discussed: discussed risks, benefits, and alternatives    Consent Given by:  Patient  Timeout: timeout called immediately prior to procedure    Prep: patient was prepped and draped in usual sterile fashion     Scribed by Valentín Johnson ATC, ATC for Dr. Ritter on 12/2/21 at 10:00AM, based on the provider's statements to me.                Objective I do not palpate an intra-articular effusion involving the right knee.  There is no redness.  There is no tenderness in the popliteal space or calf.  She is tender over the medial joint line but nontender over the lateral joint line.  I can flex and extend the right knee fully.  Overlying skin is normal.  Appropriate in conversation affect.    Assessment right-sided knee DJD.  Left-sided distal femur bone lesion on x-ray    Plan: We went over her left femur pictures.  The radiologist suggested an MRI.  An MRI is pending as well as a phone conversation after the MRI results return.  For the right knee, after informed consent about bleeding, infection, steroid flare after prepping with surgical scrub she was injected in the right knee from a lateral approach in a seated position with 1 cc of Kenalog 40 and 5 cc of 1% lidocaine.  The  medicine went in easily she left the clinic ambulatory she will look for improved with the injection and follow-up as needed.  She would like to see physical therapy for her right knee for some alignment exercises in the setting of arthritis.  Referral placed.        Alexis Ritter MD

## 2021-12-02 NOTE — NURSING NOTE
[address] [address]  Dept: [phone]  ______________________________________________________________________________    Patient: Shereen Green   : 1950   MRN: [id number]   2021    INVASIVE PROCEDURE SAFETY CHECKLIST    Date: 21   Procedure:R knee CSI with kenalog  Patient Name: Shereen Green  MRN: [id number]  YOB: 1950    Action: Complete sections as appropriate. Any discrepancy results in a HARD COPY until resolved.     PRE PROCEDURE:  Patient ID verified with 2 identifiers (name and  or MRN): Yes  Procedure and site verified with patient/designee (when able): Yes  Accurate consent documentation in medical record: Yes  H&P (or appropriate assessment) documented in medical record: Yes  H&P must be up to 20 days prior to procedure and updates within 24 hours of procedure as applicable: NA  Relevant diagnostic and radiology test results appropriately labeled and displayed as applicable: Yes  Procedure site(s) marked with provider initials: NA    TIMEOUT:  Time-Out performed immediately prior to starting procedure, including verbal and active participation of all team members addressing the following:Yes  * Correct patient identify  * Confirmed that the correct side and site are marked  * An accurate procedure consent form  * Agreement on the procedure to be done  * Correct patient position  * Relevant images and results are properly labeled and appropriately displayed  * The need to administer antibiotics or fluids for irrigation purposes during the procedure as applicable   * Safety precautions based on patient history or medication use    DURING PROCEDURE: Verification of correct person, site, and procedures any time the responsibility for care of the patient is transferred to another member of the care team.       Prior to injection, verified patient identity using patient's name and date of  birth.  Due to injection administration, patient instructed to remain in clinic for 15 minutes  afterwards, and to report any adverse reaction to me immediately.    Joint injection was performed.      Drug Amount Wasted:  Yes: 1 mg/ml   Vial/Syringe: Single dose vial  Expiration Date:  9/25 - lidocaine  4/23 - jony Johnson ATC  December 2, 2021

## 2021-12-03 RX ORDER — LIDOCAINE HYDROCHLORIDE 10 MG/ML
4 INJECTION, SOLUTION EPIDURAL; INFILTRATION; INTRACAUDAL; PERINEURAL
Status: SHIPPED | OUTPATIENT
Start: 2021-12-02

## 2021-12-03 RX ORDER — TRIAMCINOLONE ACETONIDE 40 MG/ML
40 INJECTION, SUSPENSION INTRA-ARTICULAR; INTRAMUSCULAR
Status: SHIPPED | OUTPATIENT
Start: 2021-12-02

## 2021-12-09 ENCOUNTER — TELEPHONE (OUTPATIENT)
Dept: ORTHOPEDICS | Facility: CLINIC | Age: 71
End: 2021-12-09

## 2021-12-09 DIAGNOSIS — M89.9 LYTIC BONE LESION OF LEFT FEMUR: Primary | ICD-10-CM

## 2021-12-09 NOTE — TELEPHONE ENCOUNTER
Spoke with patient and informed her that she can try and call [facility] and see if they have an open MRI option if not she could call [facility] and they have that option, if she choose that option then she would need to let us know so we can fax over the referral to them. She understood.

## 2021-12-10 NOTE — TELEPHONE ENCOUNTER
[facility] Call Center    Phone Message    May a detailed message be left on voicemail: yes     Reason for Call: Other: pt is calling about getting an order for an open MRI. [facility] is not in network with her insurance unfortunatly.       Did cancel the appt on the 14th since she     Action Taken: Message routed to:  [facility]o    Travel Screening: Not Applicable

## 2021-12-14 NOTE — TELEPHONE ENCOUNTER
I attempted to call the patient after discussing with Dr. Ritter her MRI options. No answer, phone continued to ring. I will attempt to call her again later. Keturah Padilla ATC on 12/14/2021 at 12:45 PM

## 2021-12-14 NOTE — TELEPHONE ENCOUNTER
Patient returned phone call to further discuss MRI. After discussing with Dr. Ritter, we talked about her options, which are; 1. Insurance approves MRI at [facility] for open face MRI. 2. Try repeat left knee MRI at our [place] locations. 3. Defer MRI and repeat xrays in 6 months. I did inform the patient that the radiologist did not find that the lesion was very critical, but patient should know there were changes in the bone lesion from previous xrays. Patient reports that she talked to her insurance company and medicare will cover a percentage of the MRI and her out of pocket cost would be ~$90. With that being said, patient would like to schedule MRI at [facility] instead of waiting 6 months for repeat XR. I placed new MRI order for open face wide bore 1.5 GE and faxed this order to [facility]. Patient was going to call and schedule this as well as a Follow-up with Dr. Ritter after MRI. All questions were answered at this time and patient was in agreement of this plan. Keturah Padilla ATC on 12/14/2021 at 1:50 PM

## 2021-12-14 NOTE — TELEPHONE ENCOUNTER
[facility] Call Center    Phone Message    May a detailed message be left on voicemail: yes     Reason for Call: Other: Patient has decided to have her MRI at [facility].  She did not have the faxed number for that location, only the phone number. The phone number is [phone].   Patient is requesting to have the MRI order sent over to [facility] in [place].     Action Taken: Message routed to:  [facility] sports    Travel Screening: Not Applicable

## 2021-12-17 ENCOUNTER — TELEPHONE (OUTPATIENT)
Dept: ORTHOPEDICS | Facility: CLINIC | Age: 71
End: 2021-12-17
Payer: COMMERCIAL

## 2021-12-17 NOTE — TELEPHONE ENCOUNTER
[facility] Call Center    Phone Message    May a detailed message be left on voicemail: yes     Reason for Call: Other: Patient wants to go to [facility], per patient insurance, auth was already approved to [place], so [facility] needs [facility] to update location to [facility], or prior auth needs to be cancelled so that [facility] can get a new auth from insurance. Please call [facility] and let them know what the prior auth team would like to do for that.     Action Taken: Message routed to:  [facility] Sports ortho    Travel Screening: Not Applicable

## 2021-12-20 NOTE — TELEPHONE ENCOUNTER
I returned [facility] radiology's phone call and discussed next step. They are needing the facility location to be changed from [place] to [facility] in order for patient to get MRI done on 12/22/21 at [facility] as scheduled. I attempted to call [facility] to change this, but they needed authorization #, which I did not have. I have sent a message to the prior authorization team to call [facility] and have this changed. Keturah Padilla, ATC on 12/20/2021 at 11:54 AM

## 2021-12-23 ENCOUNTER — TELEPHONE (OUTPATIENT)
Dept: ORTHOPEDICS | Facility: CLINIC | Age: 71
End: 2021-12-23
Payer: COMMERCIAL

## 2021-12-23 NOTE — TELEPHONE ENCOUNTER
I discussed with Shereen by phone today the results of her distal femur MRI at [facility].  I called her at [phone].  The results indicated a benign enchondroma.  We discussed that the x-ray did suggest some degenerative changes with her left knee including arthritic change.  She was pleased with the results of the MRI and will follow up in clinic for knee pain if needed.  pm

## 2022-01-03 NOTE — TELEPHONE ENCOUNTER
Patient calling wanting to discuss possible cataract surgery. Patient was last seen 3-30-21. Please call patient to discuss.

## 2022-02-01 ENCOUNTER — TELEPHONE (OUTPATIENT)
Dept: OPHTHALMOLOGY | Facility: CLINIC | Age: 72
End: 2022-02-01
Payer: COMMERCIAL

## 2022-02-02 NOTE — TELEPHONE ENCOUNTER
Spoke to patient and she said that she checked her insurance and it is not covering Dr. Grover/[facility] clinic visits anymore. Also does not cover the [facility].  She wanted a referral, which I said [facility], but she will need to call her insurance and see if they cover her there. She would also need an appt after 3/30/22 to see Dr. Grover if she wants to get in as she was last seen 3-30-21 for a cataract eval.

## 2022-02-03 ENCOUNTER — TELEPHONE (OUTPATIENT)
Dept: OPHTHALMOLOGY | Facility: CLINIC | Age: 72
End: 2022-02-03
Payer: COMMERCIAL

## 2022-02-03 NOTE — TELEPHONE ENCOUNTER
Pt called, stated she talked to her insurance and they do cover [facility] for her cataract surgery. Pt would like a call back to get this scheduled.    Luciana Zuñiga Patient Representative

## 2022-02-03 NOTE — TELEPHONE ENCOUNTER
Called patient and let her know that she needs another cataract eval with Dr. Grover before she is scheduled for cataract surgery. She was under the misconception that she was supposed to call Call in November 2021 for an appointment in March 2022 for Complete Exam, but she thought surgery instead. So she will come to the appointment in April as that is one year after her appt 3-31-21. Then we can see what the vision is as well since it was 20/20 and 20/25 last visit. After that we can decide if she is actually needing to schedule.

## 2022-04-01 ENCOUNTER — OFFICE VISIT (OUTPATIENT)
Dept: OPHTHALMOLOGY | Facility: CLINIC | Age: 72
End: 2022-04-01
Payer: COMMERCIAL

## 2022-04-01 DIAGNOSIS — H25.813 COMBINED FORMS OF AGE-RELATED CATARACT OF BOTH EYES: Primary | ICD-10-CM

## 2022-04-01 DIAGNOSIS — Z98.890 HISTORY OF REPAIR OF RETINAL TEAR BY LASER PHOTOCOAGULATION: ICD-10-CM

## 2022-04-01 DIAGNOSIS — H43.813 POSTERIOR VITREOUS DETACHMENT OF BOTH EYES: ICD-10-CM

## 2022-04-01 DIAGNOSIS — H52.13 MYOPIA OF BOTH EYES WITH REGULAR ASTIGMATISM AND PRESBYOPIA: ICD-10-CM

## 2022-04-01 DIAGNOSIS — H52.4 MYOPIA OF BOTH EYES WITH REGULAR ASTIGMATISM AND PRESBYOPIA: ICD-10-CM

## 2022-04-01 DIAGNOSIS — H52.223 MYOPIA OF BOTH EYES WITH REGULAR ASTIGMATISM AND PRESBYOPIA: ICD-10-CM

## 2022-04-01 PROCEDURE — 92015 DETERMINE REFRACTIVE STATE: CPT | Performed by: STUDENT IN AN ORGANIZED HEALTH CARE EDUCATION/TRAINING PROGRAM

## 2022-04-01 PROCEDURE — 92014 COMPRE OPH EXAM EST PT 1/>: CPT | Performed by: STUDENT IN AN ORGANIZED HEALTH CARE EDUCATION/TRAINING PROGRAM

## 2022-04-01 ASSESSMENT — REFRACTION_WEARINGRX
OS_CYLINDER: SPHERE
OD_AXIS: 170
SPECS_TYPE: OTC READERS
OS_CYLINDER: +0.75
OD_CYLINDER: +0.25
OS_SPHERE: -1.75
OD_SPHERE: -0.75
OS_AXIS: 180
OS_SPHERE: +1.25
SPECS_TYPE: SVL
OD_SPHERE: +1.25
OD_CYLINDER: SPHERE

## 2022-04-01 ASSESSMENT — VISUAL ACUITY
OS_CC+: -2
METHOD: SNELLEN - LINEAR
OS_CC: 20/25
OD_CC: 20/50
OD_CC+: +2
OD_PH_CC: 20/25
CORRECTION_TYPE: GLASSES
OD_PH_CC+: -2

## 2022-04-01 ASSESSMENT — EXTERNAL EXAM - LEFT EYE: OS_EXAM: MILD TO MOD BROW

## 2022-04-01 ASSESSMENT — REFRACTION_MANIFEST
OD_AXIS: 170
OS_AXIS: 180
OS_ADD: +2.25
OD_CYLINDER: +0.25
OD_ADD: +2.25
OD_SPHERE: -1.75
OS_SPHERE: -2.00
OS_CYLINDER: +1.25

## 2022-04-01 ASSESSMENT — CUP TO DISC RATIO
OS_RATIO: 0.4
OD_RATIO: 0.2

## 2022-04-01 ASSESSMENT — EXTERNAL EXAM - RIGHT EYE: OD_EXAM: MILD TO MOD BROW

## 2022-04-01 ASSESSMENT — TONOMETRY
IOP_METHOD: APPLANATION
OS_IOP_MMHG: 17
OD_IOP_MMHG: 15

## 2022-04-01 ASSESSMENT — CONF VISUAL FIELD
OD_NORMAL: 1
OS_NORMAL: 1

## 2022-04-01 ASSESSMENT — SLIT LAMP EXAM - LIDS
COMMENTS: NORMAL
COMMENTS: NORMAL

## 2022-04-01 NOTE — PATIENT INSTRUCTIONS
Glasses prescription given - recommend updating    Jaya Grover MD  [phone]

## 2022-04-01 NOTE — PROGRESS NOTES
Current Eye Medications:  Stye drops as needed     Subjective:  Here for complete eye exam today. Noticed that her right eye is less clear. No new floaters or floaters. Left lower eyelid has a red spot and she has stye drops she has been using. Using glasses mainly for driving, doesn't wear to read. Had a laser for retinal tear left eye      Objective:  See Ophthalmology Exam.       Assessment:  Shereen Green is a 71 year old female who presents with:   Encounter Diagnoses   Name Primary?     Combined forms of age-related cataract, mild-mod, of both eyes Yes    Monitor.     History of repair of retinal tear by laser photocoagulation, os      Posterior vitreous detachment of both eyes      Myopia of both eyes with regular astigmatism and presbyopia        Plan:  Glasses prescription given - recommend updating    Jaya Grover MD  [phone]

## 2022-04-01 NOTE — LETTER
4/1/2022         RE: Shereen Green  [address]  [place] [address]        Dear Colleague,    Thank you for referring your patient, Shereen Green, to the [facility]. Please see a copy of my visit note below.     Current Eye Medications:  Stye drops as needed     Subjective:  Here for complete eye exam today. Noticed that her right eye is less clear. No new floaters or floaters. Left lower eyelid has a red spot and she has stye drops she has been using. Using glasses mainly for driving, doesn't wear to read. Had a laser for retinal tear left eye      Objective:  See Ophthalmology Exam.       Assessment:  Shereen Green is a 71 year old female who presents with:   Encounter Diagnoses   Name Primary?     Combined forms of age-related cataract, mild-mod, of both eyes Yes    Monitor.     History of repair of retinal tear by laser photocoagulation, os      Posterior vitreous detachment of both eyes      Myopia of both eyes with regular astigmatism and presbyopia        Plan:  Glasses prescription given - recommend updating    Jaya Grover MD  [phone]                            Again, thank you for allowing me to participate in the care of your patient.        Sincerely,        Jaya Grover MD

## 2023-04-28 ENCOUNTER — ANCILLARY PROCEDURE (OUTPATIENT)
Dept: MAMMOGRAPHY | Facility: CLINIC | Age: 73
End: 2023-04-28
Attending: INTERNAL MEDICINE
Payer: COMMERCIAL

## 2023-04-28 DIAGNOSIS — Z12.31 VISIT FOR SCREENING MAMMOGRAM: ICD-10-CM

## 2023-04-28 PROCEDURE — 77067 SCR MAMMO BI INCL CAD: CPT | Mod: TC | Performed by: RADIOLOGY

## 2023-04-28 PROCEDURE — 77063 BREAST TOMOSYNTHESIS BI: CPT | Mod: TC | Performed by: RADIOLOGY

## (undated) RX ORDER — TRIAMCINOLONE ACETONIDE 40 MG/ML
INJECTION, SUSPENSION INTRA-ARTICULAR; INTRAMUSCULAR
Status: DISPENSED
Start: 2021-12-02

## (undated) RX ORDER — LIDOCAINE HYDROCHLORIDE 10 MG/ML
INJECTION, SOLUTION EPIDURAL; INFILTRATION; INTRACAUDAL; PERINEURAL
Status: DISPENSED
Start: 2021-12-02